# Patient Record
Sex: FEMALE | Race: WHITE | NOT HISPANIC OR LATINO | Employment: FULL TIME | URBAN - METROPOLITAN AREA
[De-identification: names, ages, dates, MRNs, and addresses within clinical notes are randomized per-mention and may not be internally consistent; named-entity substitution may affect disease eponyms.]

---

## 2019-02-07 ENCOUNTER — OFFICE VISIT (OUTPATIENT)
Dept: ORTHOPEDIC SURGERY | Facility: CLINIC | Age: 34
End: 2019-02-07

## 2019-02-07 VITALS
WEIGHT: 185 LBS | DIASTOLIC BLOOD PRESSURE: 73 MMHG | SYSTOLIC BLOOD PRESSURE: 109 MMHG | HEART RATE: 81 BPM | BODY MASS INDEX: 32.78 KG/M2 | HEIGHT: 63 IN

## 2019-02-07 DIAGNOSIS — S86.911A KNEE STRAIN, RIGHT, INITIAL ENCOUNTER: Primary | ICD-10-CM

## 2019-02-07 PROCEDURE — 99204 OFFICE O/P NEW MOD 45 MIN: CPT | Performed by: ORTHOPAEDIC SURGERY

## 2019-02-07 RX ORDER — LEVOTHYROXINE SODIUM 175 UG/1
175 TABLET ORAL DAILY
COMMUNITY
End: 2020-09-21

## 2019-02-07 RX ORDER — METHYLPREDNISOLONE 4 MG/1
TABLET ORAL
Qty: 21 TABLET | Refills: 0 | Status: SHIPPED | OUTPATIENT
Start: 2019-02-07 | End: 2019-02-21

## 2019-02-07 RX ORDER — MELOXICAM 7.5 MG/1
7.5 TABLET ORAL DAILY
Qty: 30 TABLET | Refills: 5 | Status: SHIPPED | OUTPATIENT
Start: 2019-02-07 | End: 2020-09-21

## 2019-02-07 NOTE — PROGRESS NOTES
Subjective: Right knee pain     Patient ID: Claudia Arizmendi is a 34 y.o. female.    Chief Complaint:    History of Present Illness 34-year-old female is seen for her right knee which he injured Monday when she slipped and strained the knee and she fell.  Does not think the knee struck the ground but sustained a valgus injury.  Developed immediate pain and discomfort at that time.  Then seen in urgent care x-rayed and now presents here.  She states she is given no medications at urgent care.  She describes the pain now is 5 out of 10 and initially had significant swelling has subsided to some extent but still has pain when she walks and needs to wear a brace denies any prior history of knee pain or discomfort.       Social History     Occupational History   • Not on file   Tobacco Use   • Smoking status: Never Smoker   • Smokeless tobacco: Never Used   Substance and Sexual Activity   • Alcohol use: No     Frequency: Never   • Drug use: No   • Sexual activity: Defer      Review of Systems   Constitutional: Negative for chills, diaphoresis, fever and unexpected weight change.   HENT: Negative for hearing loss, nosebleeds, sore throat and tinnitus.    Eyes: Negative for pain and visual disturbance.   Respiratory: Negative for cough, shortness of breath and wheezing.    Cardiovascular: Negative for chest pain and palpitations.   Gastrointestinal: Negative for abdominal pain, diarrhea, nausea and vomiting.   Endocrine: Negative for cold intolerance, heat intolerance and polydipsia.   Genitourinary: Negative for difficulty urinating, dysuria and hematuria.   Musculoskeletal: Positive for joint swelling and myalgias. Negative for arthralgias.   Skin: Negative for rash and wound.   Allergic/Immunologic: Negative for environmental allergies.   Neurological: Negative for dizziness, syncope and numbness.   Hematological: Does not bruise/bleed easily.   Psychiatric/Behavioral: Negative for dysphoric mood and sleep disturbance. The  patient is not nervous/anxious.          Past Medical History:   Diagnosis Date   • Celiac disease    • Hashimoto's disease      Past Surgical History:   Procedure Laterality Date   •  SECTION  2009   • CYST REMOVAL Right      History reviewed. No pertinent family history.      Objective:  Vitals:    19 1022   BP: 109/73   Pulse: 81         19  1022   Weight: 83.9 kg (185 lb)     Body mass index is 32.77 kg/m².        Ortho Exam   review the x-rays and the outside facility AP lateral sunrise do not show any acute or chronic changes.  No prior x-rays available for comparison.  She is alert and oriented ×3.  Head is normocephalic.  Eyes clear.  The knee shows no erythema she does have a mild effusion to the knee.  She lacks about 10° of extension and can flex to 90°.  No instability at 90° and no pain to varus valgus stressing in extension.  There is some mild tenderness over the MCL there is no joint line tenderness but negative Lalo's.  Quad function is 3 out of 5 secondary to pain.  Calf is nontender with a negative Homans.  His good distal pulses no motor sensory deficit good capillary refill.  She's tolerated over-the-counter anti-inflammatories without any GI side effects.  She walks with a slight antalgic gait secondary to pain.  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    Assessment:        1. Knee strain, right, initial encounter           Plan: For 25 minutes was spent with the patient face-to-face reviewing her x-rays and physical findings.  I believe she sustained an acute strain to the knee involving the MCL.  My recommendation is to begin after discussing treatment options a steroid pack followed by meloxicam 7.5 daily.  Advised on contrast baths to the knee and he followed by ice.  We'll keep her off work tomorrow but she doesn't have to return that until next week.  Return to see me in 2 weeks for follow-up visit.  Patient was in  agreement with the treatment recommendations.            Work Status:    MIGUELINA query complete.    Orders:  No orders of the defined types were placed in this encounter.      Medications:  New Medications Ordered This Visit   Medications   • meloxicam (MOBIC) 7.5 MG tablet     Sig: Take 1 tablet by mouth Daily.     Dispense:  30 tablet     Refill:  5   • MethylPREDNISolone (MEDROL, GINNA,) 4 MG tablet     Sig: Use as directed by package instructions     Dispense:  21 tablet     Refill:  0       Followup:  Return in about 2 weeks (around 2/21/2019).          Dictated utilizing Dragon dictation

## 2019-02-21 ENCOUNTER — OFFICE VISIT (OUTPATIENT)
Dept: ORTHOPEDIC SURGERY | Facility: CLINIC | Age: 34
End: 2019-02-21

## 2019-02-21 VITALS — WEIGHT: 180 LBS | HEIGHT: 63 IN | BODY MASS INDEX: 31.89 KG/M2

## 2019-02-21 DIAGNOSIS — S86.911A KNEE STRAIN, RIGHT, INITIAL ENCOUNTER: Primary | ICD-10-CM

## 2019-02-21 PROCEDURE — 99212 OFFICE O/P EST SF 10 MIN: CPT | Performed by: ORTHOPAEDIC SURGERY

## 2019-02-21 NOTE — PROGRESS NOTES
Subjective: Right knee strain     Patient ID: Claudia Arizmendi is a 34 y.o. female.    Chief Complaint:    History of Present Illness after having finished his steroid pack and started on the meloxicam 7.5.  Patient returns noting significant improvement as far as decreasing the pain discomfort in the right knee minimal pain in the knee at this time     Social History     Occupational History   • Not on file   Tobacco Use   • Smoking status: Never Smoker   • Smokeless tobacco: Never Used   Substance and Sexual Activity   • Alcohol use: No     Frequency: Never   • Drug use: No   • Sexual activity: Defer      Review of Systems   Constitutional: Negative for chills, diaphoresis, fever and unexpected weight change.   HENT: Negative for hearing loss, nosebleeds, sore throat and tinnitus.    Eyes: Negative for pain and visual disturbance.   Respiratory: Negative for cough, shortness of breath and wheezing.    Cardiovascular: Negative for chest pain and palpitations.   Gastrointestinal: Negative for abdominal pain, diarrhea, nausea and vomiting.   Endocrine: Negative for cold intolerance, heat intolerance and polydipsia.   Genitourinary: Negative for difficulty urinating, dysuria and hematuria.   Musculoskeletal: Positive for myalgias. Negative for arthralgias and joint swelling.   Skin: Negative for rash and wound.   Allergic/Immunologic: Negative for environmental allergies.   Neurological: Negative for dizziness, syncope and numbness.   Hematological: Does not bruise/bleed easily.   Psychiatric/Behavioral: Negative for dysphoric mood and sleep disturbance. The patient is not nervous/anxious.          Past Medical History:   Diagnosis Date   • Celiac disease    • Hashimoto's disease      Past Surgical History:   Procedure Laterality Date   •  SECTION  2009   • CYST REMOVAL Right      History reviewed. No pertinent family history.      Objective:  There were no vitals filed for this visit.       "02/21/19  0904   Weight: 81.6 kg (180 lb)     Body mass index is 31.89 kg/m².        Ortho Exam   Is alert and oriented x3.  The knee has no swelling effusion erythema and she has 0-125 degrees of motion with no patellofemoral crepitus or pain.  She does feel some \"popping \"but again there is no pain.  No instability 0 90 degrees no varus valgus instability in her calf is nontender.  Tolerating meloxicam without any GI.    Assessment:        1. Knee strain, right, initial encounter           Plan: Patient is doing well has returned to work.  Over 5 minutes was spent face-to-face with the patient reviewing her findings.  I did recommend she continue the meloxicam until asymptomatic.  Return to see me in a month if she is still symptomatic as far as the popping            Work Status:    MIGUELINA query complete.    Orders:  No orders of the defined types were placed in this encounter.      Medications:  No orders of the defined types were placed in this encounter.      Followup:  Return if symptoms worsen or fail to improve.          Dictated utilizing Dragon dictation   "